# Patient Record
Sex: MALE | Race: WHITE
[De-identification: names, ages, dates, MRNs, and addresses within clinical notes are randomized per-mention and may not be internally consistent; named-entity substitution may affect disease eponyms.]

---

## 2012-11-17 VITALS — DIASTOLIC BLOOD PRESSURE: 83 MMHG | SYSTOLIC BLOOD PRESSURE: 135 MMHG

## 2018-10-03 ENCOUNTER — HOSPITAL ENCOUNTER (EMERGENCY)
Dept: HOSPITAL 33 - ED | Age: 38
Discharge: LEFT BEFORE DISCHARGE | End: 2018-10-03
Payer: COMMERCIAL

## 2018-10-03 ENCOUNTER — HOSPITAL ENCOUNTER (EMERGENCY)
Dept: HOSPITAL 33 - ED | Age: 38
Discharge: LEFT BEFORE DISCHARGE | End: 2018-10-03
Payer: MEDICAID

## 2018-10-03 VITALS — HEART RATE: 115 BPM | DIASTOLIC BLOOD PRESSURE: 56 MMHG | SYSTOLIC BLOOD PRESSURE: 104 MMHG

## 2018-10-03 VITALS — OXYGEN SATURATION: 94 %

## 2018-10-03 DIAGNOSIS — R31.9: ICD-10-CM

## 2018-10-03 DIAGNOSIS — R10.33: ICD-10-CM

## 2018-10-03 DIAGNOSIS — Z53.21: Primary | ICD-10-CM

## 2018-10-03 DIAGNOSIS — K92.0: Primary | ICD-10-CM

## 2018-10-03 LAB
ALBUMIN SERPL-MCNC: 4.5 G/DL (ref 3.5–5)
ALP SERPL-CCNC: 74 U/L (ref 38–126)
ALT SERPL-CCNC: 281 U/L (ref 0–50)
ANION GAP SERPL CALC-SCNC: 16.2 MEQ/L (ref 5–15)
AST SERPL QL: 132 U/L (ref 17–59)
BASOPHILS # BLD AUTO: 0.05 10*3/UL (ref 0–0.4)
BASOPHILS NFR BLD AUTO: 0.3 % (ref 0–0.4)
BILIRUB BLD-MCNC: 0.9 MG/DL (ref 0.2–1.3)
BUN SERPL-MCNC: 12 MG/DL (ref 9–20)
CALCIUM SPEC-MCNC: 9.6 MG/DL (ref 8.4–10.2)
CHLORIDE SERPL-SCNC: 105 MMOL/L (ref 98–107)
CO2 SERPL-SCNC: 23 MMOL/L (ref 22–30)
CREAT SERPL-MCNC: 0.95 MG/DL (ref 0.66–1.25)
EOSINOPHIL # BLD AUTO: 0.1 10*3/UL (ref 0–0.5)
GLUCOSE SERPL-MCNC: 85 MG/DL (ref 74–106)
GLUCOSE UR-MCNC: NEGATIVE MG/DL
GRANULOCYTES # BLD AUTO: 10.99 10*3/UL (ref 1.4–6.9)
HCT VFR BLD AUTO: 46.7 % (ref 42–50)
HGB BLD-MCNC: 16.4 GM/DL (ref 12.5–18)
LIPASE SERPL-CCNC: 40 U/L (ref 23–300)
LYMPHOCYTES # SPEC AUTO: 2.02 10*3/UL (ref 1–4.6)
MCH RBC QN AUTO: 31.1 PG (ref 26–32)
MCHC RBC AUTO-ENTMCNC: 35.1 G/DL (ref 32–36)
MONOCYTES # BLD AUTO: 1.32 10*3/UL (ref 0–1.3)
NEUTROPHILS NFR BLD AUTO: 75.9 % (ref 36–66)
PLATELET # BLD AUTO: 265 K/MM3 (ref 150–450)
POTASSIUM SERPLBLD-SCNC: 4.2 MMOL/L (ref 3.5–5.1)
PROT SERPL-MCNC: 7.1 G/DL (ref 6.3–8.2)
PROT UR STRIP-MCNC: 100 MG/DL
RBC # BLD AUTO: 5.28 M/MM3 (ref 4.1–5.6)
SODIUM SERPL-SCNC: 141 MMOL/L (ref 137–145)
WBC # BLD AUTO: 14.5 K/MM3 (ref 4–10.5)

## 2018-10-03 PROCEDURE — 87086 URINE CULTURE/COLONY COUNT: CPT

## 2018-10-03 PROCEDURE — 96360 HYDRATION IV INFUSION INIT: CPT

## 2018-10-03 PROCEDURE — 74176 CT ABD & PELVIS W/O CONTRAST: CPT

## 2018-10-03 PROCEDURE — 83690 ASSAY OF LIPASE: CPT

## 2018-10-03 PROCEDURE — 36415 COLL VENOUS BLD VENIPUNCTURE: CPT

## 2018-10-03 PROCEDURE — 96374 THER/PROPH/DIAG INJ IV PUSH: CPT

## 2018-10-03 PROCEDURE — 99284 EMERGENCY DEPT VISIT MOD MDM: CPT

## 2018-10-03 PROCEDURE — 82271 OCCULT BLOOD OTHER SOURCES: CPT

## 2018-10-03 PROCEDURE — 96375 TX/PRO/DX INJ NEW DRUG ADDON: CPT

## 2018-10-03 PROCEDURE — 81001 URINALYSIS AUTO W/SCOPE: CPT

## 2018-10-03 PROCEDURE — 83605 ASSAY OF LACTIC ACID: CPT

## 2018-10-03 PROCEDURE — 85025 COMPLETE CBC W/AUTO DIFF WBC: CPT

## 2018-10-03 PROCEDURE — 80053 COMPREHEN METABOLIC PANEL: CPT

## 2018-10-03 NOTE — XRAY
Indication: Abdominal pain and vomiting.



Multiple contiguous axial images obtained through the abdomen and pelvis

without contrast as ordered.



Comparison: CT pelvis January 16, 2013.



Lung bases are clear.  Heart is not enlarged.



Images through the abdomen slightly degraded by respiration artifact.

Noncontrasted stomach and bowel loops appear nonobstructed.  Normal appendix.

No free fluid/air.  Faint nonobstructing bilateral nephrocalcinosis.  5 mm

round right lobe hepatic hypodense lesion.



Remaining liver, gallbladder, pancreas, spleen, adrenal glands, kidneys,

ureters, and bladder appear unremarkable for noncontrast exam.  Again mild

bilateral iliac calcifications.  No AAA.



Osseous structures intact.  No ventral or inguinal hernias.



Impression:

1.  Respiration artifact.

2.  5 mm round hepatic hypodense lesion, possible cyst.

3.  Nonobstructing bilateral nephrocalcinosis.

4.  Remaining CT abdomen/pelvis without contrast exam is negative.



CTDI 18.41

## 2018-10-03 NOTE — ERPHSYRPT
- History of Present Illness


Time Seen by Provider: 10/03/18 14:57


Historian: patient, EMS


Exam Limitations: no limitations


Patient Subjective Stated Complaint: PT HERE FOR VOMITING,  AFTER EATING [employer], AND BIKING TO AN INTERVIEW


Triage Nursing Assessment: PT ALERT, RESP EASY, SKIN W/D/P. ABD SOFT, HE STATES 

THERE MIGHT HAV EBEEN BLOOD IN VOMIT


Physician History: 





The patient is a 38-year-old male brought in by ambulance from [facility] where he complained of having a sudden onset of lower abdominal pain 

followed by vomiting.  He states he was vomiting blood.  The vomiting of blood 

worried him so he called the ambulance.  He denies fever or chills.  He had 

just eaten at a [employer] prior to vomiting.  His past medical history is 

unremarkable.  His past surgical history is unremarkable.


Timing/Duration: today, hour(s) (2), sudden


Activities at Onset: none


Quality: sharpness, stabbing


Abdominal Pain Onset Location: periumbilical, suprapubic


Pain Radiation: no radiation


Severity of Pain-Max: severe


Severity of Pain-Current: severe


Modifying Factors: Improves With: nothing


Associated Symptoms: nausea, vomiting, No diarrhea


Previous symptoms: no prior history


Allergies/Adverse Reactions: 








No Known Drug Allergies Allergy (Verified 10/03/18 14:45)


 





Home Medications: 








No Home Meds [No Home Meds****]  05/31/14 [History]





Hx Tetanus, Diphtheria Vaccination/Date Given: Yes (2011)


Hx Influenza Vaccination/Date Given: No


Hx Pneumococcal Vaccination/Date Given: No


Immunizations Up to Date: Yes





- Review of Systems


Constitutional: No Fever, No Chills


Eyes: No Symptoms


Ears, Nose, & Throat: No Symptoms


Respiratory: No Cough, No Dyspnea


Cardiac: No Chest Pain, No Edema, No Syncope


Abdominal/Gastrointestinal: Abdominal Pain, Nausea, Vomiting, Hematemesis


Genitourinary Symptoms: No Dysuria


Musculoskeletal: No Back Pain, No Neck Pain


Skin: No Rash


Neurological: No Dizziness, No Focal Weakness, No Sensory Changes


Psychological: No Symptoms


Endocrine: No Symptoms


Hematologic/Lymphatic: No Symptoms


Immunological/Allergic: No Symptoms


All Other Systems: Reviewed and Negative





- Past Medical History


Pertinent Past Medical History: No


Neurological History: No Pertinent History


ENT History: No Pertinent History


Cardiac History: No Pertinent History


Respiratory History: No Pertinent History


Endocrine Medical History: No Pertinent History


Musculoskeletal History: No Pertinent History, Other


GI Medical History: No Pertinent History, Stomach Cancer


Psycho-Social History: No Pertinent History


Male Reproductive Disorders: No Pertinent History





- Past Surgical History


Past Surgical History: No


Neuro Surgical History: No Pertinent History


Respiratory: No Pertinent History


Gastrointestinal: No Pertinent History


Genitourinary: No Pertinent History


Musculoskeletal: No Pertinent History





- Social History


Smoking Status: Current every day smoker


How long have you smoked: 10


Exposure to second hand smoke: Yes


Alcohol Use: Socially


Drug Use: marijuana


Patient Lives Alone: No


Significant Family History: no pertinent family hx





- Nursing Vital Signs


Nursing Vital Signs: 


 Initial Vital Signs











Temperature  97.7 F   10/03/18 14:39


 


Pulse Rate  105 H  10/03/18 14:39


 


Respiratory Rate  16   10/03/18 14:39


 


Blood Pressure  145/93   10/03/18 14:39


 


O2 Sat by Pulse Oximetry  94 L  10/03/18 14:39








 Pain Scale











Pain Intensity                 2

















- Physical Exam


General Appearance: moderate distress


Eye Exam: PERRL/EOMI, eyes nml inspection


Ears, Nose, Throat Exam: normal ENT inspection, pharynx normal, moist mucous 

membranes


Neck Exam: normal inspection, non-tender, supple, full range of motion


Respiratory Exam: normal breath sounds, lungs clear, No respiratory distress


Cardiovascular Exam: regular rate/rhythm, normal heart sounds


Gastrointestinal/Abdomen Exam: tenderness (generalized. pronounced tenderness 

to suprapubic area.)


Rectal Exam: not done


Back Exam: normal inspection, normal range of motion, No CVA tenderness, No 

vertebral tenderness


Extremity Exam: normal inspection, normal range of motion, pelvis stable


Neurologic Exam: alert, oriented x 3, cooperative, normal mood/affect, nml 

cerebellar function, sensation nml, No motor deficits


Skin Exam: normal color, warm, dry


**SpO2 Interpretation**: normal


SpO2: 94


Oxygen Delivery: Room Air





- CT Exams


  ** Abdomen/Pelvis


CT Interpretation: Tele-radiologist Report (per Dr Araujo), Other (nonobstructing 

bilateral nephrocalcinosis.)


Ordered Tests: 


 Active Orders 24 hr











 Category Date Time Status


 


 Clean Catch Urine Specimen STAT Care  10/03/18 15:15 Active


 


 IV Insertion STAT Care  10/03/18 15:15 Active


 


 ABDOMEN AND PELVIS W/0 CONTRAS [CT] Stat Exams  10/03/18 15:15 Completed


 


 CBC W DIFF Stat Lab  10/03/18 15:30 Completed


 


 CMP Stat Lab  10/03/18 15:30 Completed


 


 CULTURE,URINE Stat Lab  10/03/18 16:30 Received


 


 LIPASE Stat Lab  10/03/18 15:30 Completed


 


 Lactic Acid Stat Lab  10/03/18 15:40 Completed


 


 OCCULT BLOOD, EMESIS Stat Lab  10/03/18 15:20 Completed


 


 UA W/RFX UR CULTURE Stat Lab  10/03/18 16:30 Completed








Medication Summary














Discontinued Medications














Generic Name Dose Route Start Last Admin





  Trade Name Puja  PRN Reason Stop Dose Admin


 


Sodium Chloride  1,000 mls @ 999 mls/hr  10/03/18 15:15  10/03/18 15:26





  Sodium Chloride 0.9% 1000 Ml  IV  10/03/18 16:15  999 mls/hr





  .Q1H1M STA   Administration





     





     





     





     


 


Sodium Chloride  Confirm  10/03/18 15:21  





  Sodium Chloride 0.9% 1000 Ml  Administered  10/03/18 15:22  





  Dose   





  1,000 mls @ ud   





  .ROUTE   





  .STK-MED ONE   





     





     





     





     


 


Morphine Sulfate  4 mg  10/03/18 15:15  10/03/18 15:23





  Morphine Sulfate 4 Mg Inj***  IV  10/03/18 15:16  4 mg





  STAT ONE   Administration





     





     





     





     


 


Morphine Sulfate  Confirm  10/03/18 15:21  





  Morphine Sulfate 4 Mg Inj***  Administered  10/03/18 15:22  





  Dose   





  4 mg   





  .ROUTE   





  .STK-MED ONE   





     





     





     





     


 


Ondansetron HCl  4 mg  10/03/18 14:52  10/03/18 14:53





  Zofran 4 Mg/2 Ml Vial**  IV  10/03/18 14:53  4 mg





  STAT ONE   Administration





     





     





     





     


 


Ondansetron HCl  Confirm  10/03/18 14:51  





  Zofran 4 Mg/2 Ml Vial**  Administered  10/03/18 14:52  





  Dose   





  4 mg   





  .ROUTE   





  .STK-MED ONE   





     





     





     





     


 


Promethazine HCl  25 mg  10/03/18 15:01  10/03/18 15:06





  Phenergan 25 Mg Inj***  IV  10/03/18 15:02  25 mg





  STAT ONE   Administration





     





     





     





     


 


Promethazine HCl  Confirm  10/03/18 15:05  





  Phenergan 25 Mg Inj***  Administered  10/03/18 15:06  





  Dose   





  25 mg   





  .ROUTE   





  .STK-MED ONE   





     





     





     





     











Lab/Rad Data: 


 Laboratory Result Diagrams





 10/03/18 15:30 





 10/03/18 15:30 





 Laboratory Results











  10/03/18 10/03/18 10/03/18 Range/Units





  16:30 15:40 15:30 


 


WBC     (4.0-10.5)  K/mm3


 


RBC     (4.1-5.6)  M/mm3


 


Hgb     (12.5-18.0)  gm/dl


 


Hct     (42-50)  %


 


MCV     ()  fl


 


MCH     (26-32)  pg


 


MCHC     (32-36)  g/dl


 


RDW     (11.5-14.0)  %


 


Plt Count     (150-450)  K/mm3


 


MPV     (6-9.5)  fl


 


Gran %     (36.0-66.0)  %


 


Eos # (Auto)     (0-0.5)  


 


Absolute Lymphs (auto)     (1.0-4.6)  


 


Absolute Monos (auto)     (0.0-1.3)  


 


Lymphocytes %     (24.0-44.0)  %


 


Monocytes %     (0.0-12.0)  %


 


Eosinophils %     (0.00-5.0)  %


 


Basophils %     (0.0-0.4)  %


 


Absolute Granulocytes     (1.4-6.9)  


 


Basophils #     (0-0.4)  


 


Sodium    141  (137-145)  mmol/L


 


Potassium    4.2  (3.5-5.1)  mmol/L


 


Chloride    105  ()  mmol/L


 


Carbon Dioxide    23  (22-30)  mmol/L


 


Anion Gap    16.2 H  (5-15)  MEQ/L


 


BUN    12  (9-20)  mg/dL


 


Creatinine    0.95  (0.66-1.25)  mg/dL


 


Estimated GFR    > 60.0  ML/MIN


 


Glucose    85  ()  mg/dL


 


Lactic Acid   1.0   (0.4-2.0)  


 


Calcium    9.6  (8.4-10.2)  mg/dL


 


Total Bilirubin    0.90  (0.2-1.3)  mg/dL


 


AST    132 H  (17-59)  U/L


 


ALT    281 H  (0-50)  U/L


 


Alkaline Phosphatase    74  ()  U/L


 


Serum Total Protein    7.1  (6.3-8.2)  g/dL


 


Albumin    4.5  (3.5-5.0)  g/dL


 


Lipase    40  ()  U/L


 


Urine Color  IVA    (YELLOW)  


 


Urine Appearance  SLIGHTLY CLOUDY    (CLEAR)  


 


Urine pH  5.0    (5-6)  


 


Ur Specific Gravity  1.023    (1.005-1.025)  


 


Urine Protein  100    (Negative)  


 


Urine Ketones  SMALL    (NEGATIVE)  


 


Urine Blood  LARGE    (0-5)  Blanco/ul


 


Urine Nitrite  NEGATIVE    (NEGATIVE)  


 


Urine Bilirubin  NEGATIVE    (NEGATIVE)  


 


Urine Urobilinogen  2    (0-1)  mg/dL


 


Ur Leukocyte Esterase  NEGATIVE    (NEGATIVE)  


 


Urine WBC (Auto)  6-10    (0-5)  /HPF


 


Urine RBC (Auto)  >101    (0-2)  /HPF


 


U Hyaline Cast (Auto)  10-25    (0-2)  /LPF


 


U Epithel Cells (Auto)  RARE    (FEW)  /HPF


 


Urine Bacteria (Auto)  FEW    (NEGATIVE)  /HPF


 


Other Casts (Auto)  5-10    (NEGATIVE)  /LPF


 


Urine Mucus (Auto)  SLIGHT    (NEGATIVE)  /HPF


 


Urine Culture Reflexed  YES    (NO)  


 


Urine Glucose  NEGATIVE    (NEGATIVE)  mg/dL


 


Emesis for Blood     (Negative)  














  10/03/18 10/03/18 Range/Units





  15:30 15:20 


 


WBC  14.5 H   (4.0-10.5)  K/mm3


 


RBC  5.28   (4.1-5.6)  M/mm3


 


Hgb  16.4   (12.5-18.0)  gm/dl


 


Hct  46.7   (42-50)  %


 


MCV  88.4   ()  fl


 


MCH  31.1   (26-32)  pg


 


MCHC  35.1   (32-36)  g/dl


 


RDW  13.6   (11.5-14.0)  %


 


Plt Count  265   (150-450)  K/mm3


 


MPV  10.3 H   (6-9.5)  fl


 


Gran %  75.9 H   (36.0-66.0)  %


 


Eos # (Auto)  0.10   (0-0.5)  


 


Absolute Lymphs (auto)  2.02   (1.0-4.6)  


 


Absolute Monos (auto)  1.32 H   (0.0-1.3)  


 


Lymphocytes %  14.0 L   (24.0-44.0)  %


 


Monocytes %  9.1   (0.0-12.0)  %


 


Eosinophils %  0.7   (0.00-5.0)  %


 


Basophils %  0.3   (0.0-0.4)  %


 


Absolute Granulocytes  10.99 H   (1.4-6.9)  


 


Basophils #  0.05   (0-0.4)  


 


Sodium    (137-145)  mmol/L


 


Potassium    (3.5-5.1)  mmol/L


 


Chloride    ()  mmol/L


 


Carbon Dioxide    (22-30)  mmol/L


 


Anion Gap    (5-15)  MEQ/L


 


BUN    (9-20)  mg/dL


 


Creatinine    (0.66-1.25)  mg/dL


 


Estimated GFR    ML/MIN


 


Glucose    ()  mg/dL


 


Lactic Acid    (0.4-2.0)  


 


Calcium    (8.4-10.2)  mg/dL


 


Total Bilirubin    (0.2-1.3)  mg/dL


 


AST    (17-59)  U/L


 


ALT    (0-50)  U/L


 


Alkaline Phosphatase    ()  U/L


 


Serum Total Protein    (6.3-8.2)  g/dL


 


Albumin    (3.5-5.0)  g/dL


 


Lipase    ()  U/L


 


Urine Color    (YELLOW)  


 


Urine Appearance    (CLEAR)  


 


Urine pH    (5-6)  


 


Ur Specific Gravity    (1.005-1.025)  


 


Urine Protein    (Negative)  


 


Urine Ketones    (NEGATIVE)  


 


Urine Blood    (0-5)  Blanco/ul


 


Urine Nitrite    (NEGATIVE)  


 


Urine Bilirubin    (NEGATIVE)  


 


Urine Urobilinogen    (0-1)  mg/dL


 


Ur Leukocyte Esterase    (NEGATIVE)  


 


Urine WBC (Auto)    (0-5)  /HPF


 


Urine RBC (Auto)    (0-2)  /HPF


 


U Hyaline Cast (Auto)    (0-2)  /LPF


 


U Epithel Cells (Auto)    (FEW)  /HPF


 


Urine Bacteria (Auto)    (NEGATIVE)  /HPF


 


Other Casts (Auto)    (NEGATIVE)  /LPF


 


Urine Mucus (Auto)    (NEGATIVE)  /HPF


 


Urine Culture Reflexed    (NO)  


 


Urine Glucose    (NEGATIVE)  mg/dL


 


Emesis for Blood   POSITIVE  (Negative)  














- Progress


Progress: improved


Counseled pt/family regarding: lab results, diagnosis, rad results





- Departure


Time of Disposition: 17:19


Departure Disposition: AMA


Clinical Impression: 


 Vomiting blood, Abdominal pain, Hematuria





Condition: Stable


Critical Care Time: No


Referrals: 


BIBIANA PADILLA [Primary Care Provider] - 


Additional Instructions: 


The patient left before the final diagnosis was given him.  He had received a 

phone call from his wife stating that his son was in an automobile accident and 

was being taken to [facility] for critical care.  He left AMA.

## 2019-03-29 ENCOUNTER — HOSPITAL ENCOUNTER (EMERGENCY)
Dept: HOSPITAL 33 - ED | Age: 39
Discharge: HOME | End: 2019-03-29
Payer: COMMERCIAL

## 2019-03-29 VITALS — OXYGEN SATURATION: 97 %

## 2019-03-29 VITALS — HEART RATE: 77 BPM | SYSTOLIC BLOOD PRESSURE: 123 MMHG | DIASTOLIC BLOOD PRESSURE: 80 MMHG

## 2019-03-29 DIAGNOSIS — N20.0: Primary | ICD-10-CM

## 2019-03-29 LAB
ALBUMIN SERPL-MCNC: 3.9 G/DL (ref 3.5–5)
ALP SERPL-CCNC: 57 U/L (ref 38–126)
ALT SERPL-CCNC: 180 U/L (ref 0–50)
AMPHETAMINES UR QL: NEGATIVE
AMYLASE SERPL-CCNC: 75 U/L (ref 30–110)
ANION GAP SERPL CALC-SCNC: 12.2 MEQ/L (ref 5–15)
AST SERPL QL: 95 U/L (ref 17–59)
BARBITURATES UR QL: NEGATIVE
BASOPHILS # BLD AUTO: 0.05 10*3/UL (ref 0–0.4)
BASOPHILS NFR BLD AUTO: 0.6 % (ref 0–0.4)
BENZODIAZ UR QL SCN: NEGATIVE
BILIRUB BLD-MCNC: 0.5 MG/DL (ref 0.2–1.3)
BUN SERPL-MCNC: 14 MG/DL (ref 9–20)
CALCIUM SPEC-MCNC: 9.4 MG/DL (ref 8.4–10.2)
CHLORIDE SERPL-SCNC: 106 MMOL/L (ref 98–107)
CO2 SERPL-SCNC: 25 MMOL/L (ref 22–30)
COCAINE UR QL SCN: NEGATIVE
CREAT SERPL-MCNC: 0.77 MG/DL (ref 0.66–1.25)
EOSINOPHIL # BLD AUTO: 0.26 10*3/UL (ref 0–0.5)
GLUCOSE SERPL-MCNC: 96 MG/DL (ref 74–106)
GLUCOSE UR-MCNC: NEGATIVE MG/DL
GRANULOCYTES # BLD AUTO: 4.77 10*3/UL (ref 1.4–6.9)
HCT VFR BLD AUTO: 47.4 % (ref 42–50)
HGB BLD-MCNC: 16.2 GM/DL (ref 12.5–18)
LIPASE SERPL-CCNC: 67 U/L (ref 23–300)
LYMPHOCYTES # SPEC AUTO: 2.64 10*3/UL (ref 1–4.6)
MCH RBC QN AUTO: 31.1 PG (ref 26–32)
MCHC RBC AUTO-ENTMCNC: 34.2 G/DL (ref 32–36)
METHADONE UR QL: NEGATIVE
MONOCYTES # BLD AUTO: 0.89 10*3/UL (ref 0–1.3)
NEUTROPHILS NFR BLD AUTO: 55.4 % (ref 36–66)
OPIATES UR QL: NEGATIVE
PCP UR QL CFM>20 NG/ML: NEGATIVE
PLATELET # BLD AUTO: 230 K/MM3 (ref 150–450)
POTASSIUM SERPLBLD-SCNC: 4 MMOL/L (ref 3.5–5.1)
PROT SERPL-MCNC: 6.8 G/DL (ref 6.3–8.2)
PROT UR STRIP-MCNC: NEGATIVE MG/DL
RBC # BLD AUTO: 5.21 M/MM3 (ref 4.1–5.6)
RBC #/AREA URNS HPF: (no result) /HPF (ref 0–2)
SODIUM SERPL-SCNC: 138 MMOL/L (ref 137–145)
THC UR QL SCN: POSITIVE
WBC # BLD AUTO: 8.6 K/MM3 (ref 4–10.5)
WBC #/AREA URNS HPF: (no result) /HPF (ref 0–5)

## 2019-03-29 PROCEDURE — 96375 TX/PRO/DX INJ NEW DRUG ADDON: CPT

## 2019-03-29 PROCEDURE — 85025 COMPLETE CBC W/AUTO DIFF WBC: CPT

## 2019-03-29 PROCEDURE — 36415 COLL VENOUS BLD VENIPUNCTURE: CPT

## 2019-03-29 PROCEDURE — 83690 ASSAY OF LIPASE: CPT

## 2019-03-29 PROCEDURE — 99284 EMERGENCY DEPT VISIT MOD MDM: CPT

## 2019-03-29 PROCEDURE — 82150 ASSAY OF AMYLASE: CPT

## 2019-03-29 PROCEDURE — 81001 URINALYSIS AUTO W/SCOPE: CPT

## 2019-03-29 PROCEDURE — 74178 CT ABD&PLV WO CNTR FLWD CNTR: CPT

## 2019-03-29 PROCEDURE — 96374 THER/PROPH/DIAG INJ IV PUSH: CPT

## 2019-03-29 PROCEDURE — 36000 PLACE NEEDLE IN VEIN: CPT

## 2019-03-29 PROCEDURE — 96360 HYDRATION IV INFUSION INIT: CPT

## 2019-03-29 PROCEDURE — 80307 DRUG TEST PRSMV CHEM ANLYZR: CPT

## 2019-03-29 PROCEDURE — 80053 COMPREHEN METABOLIC PANEL: CPT

## 2019-03-29 NOTE — ERPHSYRPT
- History of Present Illness


Time Seen by Provider: 03/29/19 16:30


Historian: patient


Exam Limitations: clinical condition


Patient Subjective Stated Complaint: RIGHT LOWER ABD PAIN FOR ONE WEEK.  

VOMITED YESTERDAY.  STATES PAIN IS SHARP.


Triage Nursing Assessment: TO ROOM PER EMS COT.  SKIN W/D, COLOR NORMAL, RESP 

EASY.  ABD SOFT, GUARDING RLQ.


Physician History: 





PATIENT WITH A HISTORY OF KIDNEY STONES COMPLAINS OF RIGHT LOWER ABDOMINAL 

PAINS FOR 2 WEEKS, ASSOCIATED WITH NAUSEA, DENIES EMESIS, FEVER OR URINARY 

SYMPTOMS FREQUENCY, URGENCY OR DYSURIA.


Timing/Duration: week(s)


Activities at Onset: none


Quality: stabbing


Abdominal Pain Onset Location: [facility]


Pain Radiation: no radiation


Severity of Pain-Max: severe


Severity of Pain-Current: severe


Modifying Factors: Improves With: nothing


Associated Symptoms: denies symptoms


Previous symptoms: same symptoms as today


Allergies/Adverse Reactions: 








No Known Drug Allergies Allergy (Verified 03/29/19 16:28)


 





Home Medications: 








No Home Meds [No Home Meds****]  05/31/14 [History]





Hx Tetanus, Diphtheria Vaccination/Date Given: Yes (2015)


Hx Influenza Vaccination/Date Given: No


Hx Pneumococcal Vaccination/Date Given: No





- Review of Systems


Constitutional: No Fever, No Chills


Eyes: No Symptoms


Ears, Nose, & Throat: No Symptoms


Respiratory: No Symptoms, No Cough, No Dyspnea


Cardiac: No Symptoms, No Chest Pain, No Edema, No Syncope


Abdominal/Gastrointestinal: Abdominal Pain, Nausea, No Vomiting, No Diarrhea


Genitourinary Symptoms: No Symptoms, No Dysuria


Musculoskeletal: No Symptoms, No Back Pain, No Neck Pain


Skin: No Rash


Neurological: No Dizziness, No Focal Weakness, No Sensory Changes


Psychological: No Symptoms


Endocrine: No Symptoms


All Other Systems: Reviewed and Negative





- Past Medical History


Pertinent Past Medical History: No


Neurological History: No Pertinent History


ENT History: No Pertinent History


Cardiac History: No Pertinent History


Respiratory History: No Pertinent History


Endocrine Medical History: No Pertinent History


Musculoskeletal History: No Pertinent History


GI Medical History: No Pertinent History


Psycho-Social History: No Pertinent History


Male Reproductive Disorders: No Pertinent History





- Past Surgical History


Past Surgical History: Yes


Neuro Surgical History: No Pertinent History


Respiratory: No Pertinent History


Gastrointestinal: No Pertinent History


Genitourinary: No Pertinent History


Musculoskeletal: No Pertinent History


Other Surgical History: RIGHT ARM SURGERY





- Social History


Smoking Status: Current every day smoker


How long have you smoked: 10


Exposure to second hand smoke: Yes


Alcohol Use: Socially


Drug Use: marijuana


Patient Lives Alone: No


Significant Family History: no pertinent family hx





- Nursing Vital Signs


Nursing Vital Signs: 


 Initial Vital Signs











Temperature  97.9 F   03/29/19 16:21


 


Pulse Rate  76   03/29/19 16:21


 


Respiratory Rate  16   03/29/19 16:21


 


Blood Pressure  129/72   03/29/19 16:21


 


O2 Sat by Pulse Oximetry  97   03/29/19 16:21








 Pain Scale











Pain Intensity                 5

















- Physical Exam


General Appearance: moderate distress, alert


Eye Exam: PERRL/EOMI, eyes nml inspection


Ears, Nose, Throat Exam: normal ENT inspection, pharynx normal, moist mucous 

membranes


Neck Exam: normal inspection, non-tender, supple, full range of motion


Respiratory Exam: normal breath sounds, lungs clear, No respiratory distress


Cardiovascular Exam: regular rate/rhythm, normal heart sounds


Gastrointestinal/Abdomen Exam: soft, normal bowel sounds, tenderness (RIGHT 

LOWER QUAD TENDERNESS), other (WITH GUARDING), No mass


Back Exam: normal inspection, normal range of motion, No CVA tenderness, No 

vertebral tenderness


Extremity Exam: normal inspection, normal range of motion, pelvis stable


Neurologic Exam: alert, oriented x 3, cooperative, normal mood/affect, nml 

cerebellar function, sensation nml, No motor deficits


Skin Exam: normal color, warm, dry


Lymphatic Exam: inguinal node tender (L)


SpO2: 97





- CT Exams


  ** Abdomen/Pelvis


CT Interpretation: Tele-radiologist Report (NORMAL APPENDIX, THERE IS PUNCTATE 

NONOBSTRUCTING RIGHT NEPHROLITHIASIS)


Ordered Tests: 


 Active Orders 24 hr











 Category Date Time Status


 


 Clean Catch Urine Specimen STAT Care  03/29/19 16:31 Active


 


 IV Insertion STAT Care  03/29/19 16:31 Active


 


 ABDOMEN AND PELVIS W&WO CONTRA [CT] Stat Exams  03/29/19 16:39 Taken


 


 AMYLASE Stat Lab  03/29/19 16:35 Completed


 


 CBC W DIFF Stat Lab  03/29/19 16:35 Completed


 


 CMP Stat Lab  03/29/19 16:35 Completed


 


 LIPASE Stat Lab  03/29/19 16:35 Completed


 


 UA W/RFX UR CULTURE Stat Lab  03/29/19 16:45 Completed


 


 Urine Triage Profile Stat Lab  03/29/19 16:45 Completed








Medication Summary














Discontinued Medications














Generic Name Dose Route Start Last Admin





  Trade Name Freq  PRN Reason Stop Dose Admin


 


Hydromorphone HCl  1 mg  03/29/19 16:52  03/29/19 16:57





  Hydromorphone 1 Mg/Ml Ampule***  IV  03/29/19 16:53  1 mg





  STAT ONE   Administration





     





     





     





     


 


Hydromorphone HCl  Confirm  03/29/19 16:54  





  Hydromorphone 1 Mg/Ml Ampule***  Administered  03/29/19 16:55  





  Dose   





  1 mg   





  .ROUTE   





  .STK-MED ONE   





     





     





     





     


 


Sodium Chloride  1,000 mls @ 999 mls/hr  03/29/19 16:31  03/29/19 17:52





  Sodium Chloride 0.9% 1000 Ml  IV  03/29/19 17:31  Infused





  .Q1H1M STA   Infusion





     





     





     





     


 


Sodium Chloride  Confirm  03/29/19 16:32  





  Sodium Chloride 0.9% 1000 Ml  Administered  03/29/19 16:33  





  Dose   





  1,000 mls @ ud   





  .ROUTE   





  .STK-MED ONE   





     





     





     





     


 


Ketorolac Tromethamine  30 mg  03/29/19 16:41  03/29/19 16:55





  Toradol 30 Mg Injection***  IV  03/29/19 16:42  30 mg





  STAT ONE   Administration





     





     





     





     


 


Ketorolac Tromethamine  Confirm  03/29/19 16:50  





  Toradol 30 Mg Injection***  Administered  03/29/19 16:51  





  Dose   





  30 mg   





  .ROUTE   





  .STK-MED ONE   





     





     





     





     


 


Ondansetron HCl  4 mg  03/29/19 16:41  03/29/19 16:55





  Zofran 4 Mg/2 Ml Vial**  IV  03/29/19 16:42  4 mg





  STAT ONE   Administration





     





     





     





     


 


Ondansetron HCl  Confirm  03/29/19 16:50  





  Zofran 4 Mg/2 Ml Vial**  Administered  03/29/19 16:51  





  Dose   





  4 mg   





  .ROUTE   





  .STK-MED ONE   





     





     





     





     











Lab/Rad Data: 


 Laboratory Result Diagrams





 03/29/19 16:35 





 03/29/19 16:35 





 Laboratory Results











  03/29/19 03/29/19 03/29/19 Range/Units





  16:45 16:45 16:35 


 


WBC     (4.0-10.5)  K/mm3


 


RBC     (4.1-5.6)  M/mm3


 


Hgb     (12.5-18.0)  gm/dl


 


Hct     (42-50)  %


 


MCV     ()  fl


 


MCH     (26-32)  pg


 


MCHC     (32-36)  g/dl


 


RDW     (11.5-14.0)  %


 


Plt Count     (150-450)  K/mm3


 


MPV     (6-9.5)  fl


 


Gran %     (36.0-66.0)  %


 


Eos # (Auto)     (0-0.5)  


 


Absolute Lymphs (auto)     (1.0-4.6)  


 


Absolute Monos (auto)     (0.0-1.3)  


 


Lymphocytes %     (24.0-44.0)  %


 


Monocytes %     (0.0-12.0)  %


 


Eosinophils %     (0.00-5.0)  %


 


Basophils %     (0.0-0.4)  %


 


Absolute Granulocytes     (1.4-6.9)  


 


Basophils #     (0-0.4)  


 


Sodium    138  (137-145)  mmol/L


 


Potassium    4.0  (3.5-5.1)  mmol/L


 


Chloride    106  ()  mmol/L


 


Carbon Dioxide    25  (22-30)  mmol/L


 


Anion Gap    12.2  (5-15)  MEQ/L


 


BUN    14  (9-20)  mg/dL


 


Creatinine    0.77  (0.66-1.25)  mg/dL


 


Estimated GFR    > 60.0  ML/MIN


 


Glucose    96  ()  mg/dL


 


Calcium    9.4  (8.4-10.2)  mg/dL


 


Total Bilirubin    0.50  (0.2-1.3)  mg/dL


 


AST    95 H  (17-59)  U/L


 


ALT    180 H  (0-50)  U/L


 


Alkaline Phosphatase    57  ()  U/L


 


Serum Total Protein    6.8  (6.3-8.2)  g/dL


 


Albumin    3.9  (3.5-5.0)  g/dL


 


Amylase    75  ()  U/L


 


Lipase    67  ()  U/L


 


Urine Color   YELLOW   (YELLOW)  


 


Urine Appearance   CLEAR   (CLEAR)  


 


Urine pH   6.0   (5-6)  


 


Ur Specific Gravity   1.017   (1.005-1.025)  


 


Urine Protein   NEGATIVE   (Negative)  


 


Urine Ketones   NEGATIVE   (NEGATIVE)  


 


Urine Blood   NEGATIVE   (0-5)  Blanco/ul


 


Urine Nitrite   NEGATIVE   (NEGATIVE)  


 


Urine Bilirubin   NEGATIVE   (NEGATIVE)  


 


Urine Urobilinogen   2   (0-1)  mg/dL


 


Ur Leukocyte Esterase   NEGATIVE   (NEGATIVE)  


 


Urine WBC (Auto)   NONE   (0-5)  /HPF


 


Urine RBC (Auto)   NONE   (0-2)  /HPF


 


U Epithel Cells (Auto)   NONE   (FEW)  /HPF


 


Urine Bacteria (Auto)   NONE   (NEGATIVE)  /HPF


 


Urine Culture Reflexed   NO   (NO)  


 


Urine Glucose   NEGATIVE   (NEGATIVE)  mg/dL


 


Urine Opiates Level  NEGATIVE    (NEGATIVE)  


 


Ur Methadone  NEGATIVE    (NEGATIVE)  


 


Urine Barbiturates  NEGATIVE    (NEGATIVE)  


 


Ur Phencyclidine (PCP)  NEGATIVE    (NEGATIVE)  


 


Urine Amphetamine  NEGATIVE    (NEGATIVE)  


 


U Benzodiazepine Level  NEGATIVE    (NEGATIVE)  


 


Urine Cocaine  NEGATIVE    (NEGATIVE)  


 


Urine Marijuana (THC)  POSITIVE    (NEGATIVE)  














  03/29/19 Range/Units





  16:35 


 


WBC  8.6  (4.0-10.5)  K/mm3


 


RBC  5.21  (4.1-5.6)  M/mm3


 


Hgb  16.2  (12.5-18.0)  gm/dl


 


Hct  47.4  (42-50)  %


 


MCV  91.0  ()  fl


 


MCH  31.1  (26-32)  pg


 


MCHC  34.2  (32-36)  g/dl


 


RDW  13.2  (11.5-14.0)  %


 


Plt Count  230  (150-450)  K/mm3


 


MPV  10.7 H  (6-9.5)  fl


 


Gran %  55.4  (36.0-66.0)  %


 


Eos # (Auto)  0.26  (0-0.5)  


 


Absolute Lymphs (auto)  2.64  (1.0-4.6)  


 


Absolute Monos (auto)  0.89  (0.0-1.3)  


 


Lymphocytes %  30.7  (24.0-44.0)  %


 


Monocytes %  10.3  (0.0-12.0)  %


 


Eosinophils %  3.0  (0.00-5.0)  %


 


Basophils %  0.6  (0.0-0.4)  %


 


Absolute Granulocytes  4.77  (1.4-6.9)  


 


Basophils #  0.05  (0-0.4)  


 


Sodium   (137-145)  mmol/L


 


Potassium   (3.5-5.1)  mmol/L


 


Chloride   ()  mmol/L


 


Carbon Dioxide   (22-30)  mmol/L


 


Anion Gap   (5-15)  MEQ/L


 


BUN   (9-20)  mg/dL


 


Creatinine   (0.66-1.25)  mg/dL


 


Estimated GFR   ML/MIN


 


Glucose   ()  mg/dL


 


Calcium   (8.4-10.2)  mg/dL


 


Total Bilirubin   (0.2-1.3)  mg/dL


 


AST   (17-59)  U/L


 


ALT   (0-50)  U/L


 


Alkaline Phosphatase   ()  U/L


 


Serum Total Protein   (6.3-8.2)  g/dL


 


Albumin   (3.5-5.0)  g/dL


 


Amylase   ()  U/L


 


Lipase   ()  U/L


 


Urine Color   (YELLOW)  


 


Urine Appearance   (CLEAR)  


 


Urine pH   (5-6)  


 


Ur Specific Gravity   (1.005-1.025)  


 


Urine Protein   (Negative)  


 


Urine Ketones   (NEGATIVE)  


 


Urine Blood   (0-5)  Blanco/ul


 


Urine Nitrite   (NEGATIVE)  


 


Urine Bilirubin   (NEGATIVE)  


 


Urine Urobilinogen   (0-1)  mg/dL


 


Ur Leukocyte Esterase   (NEGATIVE)  


 


Urine WBC (Auto)   (0-5)  /HPF


 


Urine RBC (Auto)   (0-2)  /HPF


 


U Epithel Cells (Auto)   (FEW)  /HPF


 


Urine Bacteria (Auto)   (NEGATIVE)  /HPF


 


Urine Culture Reflexed   (NO)  


 


Urine Glucose   (NEGATIVE)  mg/dL


 


Urine Opiates Level   (NEGATIVE)  


 


Ur Methadone   (NEGATIVE)  


 


Urine Barbiturates   (NEGATIVE)  


 


Ur Phencyclidine (PCP)   (NEGATIVE)  


 


Urine Amphetamine   (NEGATIVE)  


 


U Benzodiazepine Level   (NEGATIVE)  


 


Urine Cocaine   (NEGATIVE)  


 


Urine Marijuana (THC)   (NEGATIVE)  














- Progress


Progress: improved, pain not gone completely


Progress Note: 





03/29/19 17:20


IV NORMAL SALINE 1000ML/HR, ZOFRAN 4MG, TORADOL 30MG, DILAUDID 1MG IV


Counseled pt/family regarding: lab results, diagnosis, need for follow-up, rad 

results





- Departure


Departure Disposition: Home


Clinical Impression: 


 RIGHT NONOBSTRUCTING NEPHROLITHIASIS





Condition: Stable


Critical Care Time: No


Referrals: 


DOCTOR,NO FAMILY [NON-STAFF PHY W/O PRIVILEGES] - 


Additional Instructions: 


CALL UROLOGIST DR LIU IN 3 DAYS FOR APPOINTMENT [phone]. STRAIN YOUR 

URINE FOR 1 WEEK. TORADOL 10MG EVERY 6 HOURS AS NEEDED FOR PAIN. FLOMAX 0.4MG 

DAILY FOR 10 DAYS. ALSO CONSULT YOUR PRIMARY CARE PROVIDER FOR FOLLOWUP.


Prescriptions: 


Ketorolac Tromethamine [Toradol] 10 mg PO Q6HPRN PRN #20 tablet


 PRN Reason: Pain


Tamsulosin HCl 0.4 mg*** [Flomax 0.4 MG***] 0.4 mg PO DAILY #10 cap

## 2019-04-01 NOTE — XRAY
Indication: Right lower abdomen pain 1 week.  Emesis.



Multiple contiguous axial images obtained through the abdomen and pelvis prior

to and following 80 cc Isovue 370 contrast as ordered.



Comparison: October 3, 2018.



Lung bases remain clear.  Heart is not enlarged.



Noncontrasted images demonstrates stable nonobstructing faint bilateral

nephrocalcinosis.  No new pathologic visceral calcification/calculi.

Noncontrasted stomach and bowel loops appear nonobstructed.  Normal appendix.

There is now moderate diffuse scattered colonic fecal debris throughout.  No

free fluid/air.



Postcontrast images demonstrates normal visceral enhancement and renal

excretion.  Stable subcentimeter hepatic cyst.  Spleen remains enlarged

measuring 13.8 cm in greatest axial dimension.  Remaining liver, gallbladder,

pancreas, spleen, adrenal glands, kidneys, ureters, bladder, and aorta appear

unremarkable.  No pathologic retroperitoneal lymphadenopathy.



Osseous structures intact.  No ventral or inguinal hernias.



Impression:

1.  Stable nonobstructing bilateral nephrocalcinosis.

2.  Stable tiny hepatic cyst and splenomegaly.

3.  New fecal stasis without obstruction.

4.  Remaining CT abdomen/pelvis with and without contrast exam is negative.



Comment: Preliminary interpretation was made by C.  No critical discrepancy.



CTDI 20.65

## 2019-08-29 ENCOUNTER — HOSPITAL ENCOUNTER (EMERGENCY)
Dept: HOSPITAL 33 - ED | Age: 39
LOS: 1 days | Discharge: ANOTHER HOSPITAL | End: 2019-08-30
Payer: COMMERCIAL

## 2019-08-29 DIAGNOSIS — I63.9: Primary | ICD-10-CM

## 2019-08-29 LAB
AMPHETAMINES UR QL: POSITIVE
ANION GAP SERPL CALC-SCNC: 13.6 MEQ/L (ref 5–15)
BARBITURATES UR QL: NEGATIVE
BASOPHILS # BLD AUTO: 0.05 10*3/UL (ref 0–0.4)
BASOPHILS NFR BLD AUTO: 0.6 % (ref 0–0.4)
BENZODIAZ UR QL SCN: NEGATIVE
BUN SERPL-MCNC: 15 MG/DL (ref 9–20)
CALCIUM SPEC-MCNC: 9.3 MG/DL (ref 8.4–10.2)
CHLORIDE SERPL-SCNC: 105 MMOL/L (ref 98–107)
CO2 SERPL-SCNC: 25 MMOL/L (ref 22–30)
COCAINE UR QL SCN: NEGATIVE
CREAT SERPL-MCNC: 0.84 MG/DL (ref 0.66–1.25)
EOSINOPHIL # BLD AUTO: 0.28 10*3/UL (ref 0–0.5)
ETHANOL SERPL-MCNC: < 10 MG/DL (ref 0–10)
GLUCOSE SERPL-MCNC: 122 MG/DL (ref 74–106)
GLUCOSE UR-MCNC: NEGATIVE MG/DL
GRANULOCYTES # BLD AUTO: 4.64 10*3/UL (ref 1.4–6.9)
HCT VFR BLD AUTO: 46.5 % (ref 42–50)
HGB BLD-MCNC: 16 GM/DL (ref 12.5–18)
LYMPHOCYTES # SPEC AUTO: 2.23 10*3/UL (ref 1–4.6)
MCH RBC QN AUTO: 30.9 PG (ref 26–32)
MCHC RBC AUTO-ENTMCNC: 34.4 G/DL (ref 32–36)
METHADONE UR QL: NEGATIVE
MONOCYTES # BLD AUTO: 0.68 10*3/UL (ref 0–1.3)
NEUTROPHILS NFR BLD AUTO: 58.9 % (ref 36–66)
OPIATES UR QL: NEGATIVE
PCP UR QL CFM>20 NG/ML: NEGATIVE
PLATELET # BLD AUTO: 221 K/MM3 (ref 150–450)
POTASSIUM SERPLBLD-SCNC: 3.6 MMOL/L (ref 3.5–5.1)
PROT UR STRIP-MCNC: 100 MG/DL
RBC # BLD AUTO: 5.17 M/MM3 (ref 4.1–5.6)
RBC #/AREA URNS HPF: >101 /HPF (ref 0–2)
SODIUM SERPL-SCNC: 139 MMOL/L (ref 137–145)
THC UR QL SCN: POSITIVE
WBC # BLD AUTO: 7.9 K/MM3 (ref 4–10.5)

## 2019-08-29 PROCEDURE — 84484 ASSAY OF TROPONIN QUANT: CPT

## 2019-08-29 PROCEDURE — 36415 COLL VENOUS BLD VENIPUNCTURE: CPT

## 2019-08-29 PROCEDURE — 93005 ELECTROCARDIOGRAM TRACING: CPT

## 2019-08-29 PROCEDURE — 96372 THER/PROPH/DIAG INJ SC/IM: CPT

## 2019-08-29 PROCEDURE — 85025 COMPLETE CBC W/AUTO DIFF WBC: CPT

## 2019-08-29 PROCEDURE — 80048 BASIC METABOLIC PNL TOTAL CA: CPT

## 2019-08-29 PROCEDURE — 70450 CT HEAD/BRAIN W/O DYE: CPT

## 2019-08-29 PROCEDURE — G0480 DRUG TEST DEF 1-7 CLASSES: HCPCS

## 2019-08-29 PROCEDURE — 85730 THROMBOPLASTIN TIME PARTIAL: CPT

## 2019-08-29 PROCEDURE — 87086 URINE CULTURE/COLONY COUNT: CPT

## 2019-08-29 PROCEDURE — 81001 URINALYSIS AUTO W/SCOPE: CPT

## 2019-08-29 PROCEDURE — 36000 PLACE NEEDLE IN VEIN: CPT

## 2019-08-29 PROCEDURE — 99285 EMERGENCY DEPT VISIT HI MDM: CPT

## 2019-08-29 PROCEDURE — 99291 CRITICAL CARE FIRST HOUR: CPT

## 2019-08-29 PROCEDURE — 80307 DRUG TEST PRSMV CHEM ANLYZR: CPT

## 2019-08-29 NOTE — ERPHSYRPT
- History of Present Illness


Source: patient, family


Exam Limitations: no limitations, clinical condition


Patient Subjective Stated Complaint: pt's girlfriend states, "he's had vomiting 

today x4 hours, his speech became slurred the last hour and pt c/o numbness 

around his rt eye".


Triage Nursing Assessment: pt alert and oriented x2, speech is garbled.  

girlfriend at bedside.  Lungs clear, heart tones reg, abd soft with active bs 

x4 quad, non-tender.  Pt denies headache but has a numbness over his right eye.

  Pupils are pinpoint.  Pt states vision is blurry.


Physician History: 





Pt is a 40 y/o male with a h/o previous CVA, that presented to the ED with 

symptoms of CVA.  Pt states, he was vomiting today, multiple times, and then he 

felt something pop in his R eye, and he had numbness of the R face, could not 

open his R eye, and had slurred speech.  Secondary to it, pt came to the ED.


Timing/Duration: today


Severity: moderate


Character of Deficits: Right Facial, vision problems


Deficits: no difficulties


Baseline/Normal Cognition: alert oriented x 3


Baseline Gait: walks w/o assistance


Associated Symptoms: nausea, vomiting, paresthesia (of the R face), slurred 

speech, vision changes


Allergies/Adverse Reactions: 








No Known Drug Allergies Allergy (Verified 03/29/19 16:28)


 





Home Medications: 








No Home Meds [No Home Meds****]  05/31/14 [History]





Hx Tetanus, Diphtheria Vaccination/Date Given: Yes


Hx Influenza Vaccination/Date Given: No


Hx Pneumococcal Vaccination/Date Given: No


Immunizations Up to Date: Yes





- Review of Systems


Constitutional: No Fever, No Chills


Eyes: Vision Changes


Ears, Nose, & Throat: Other (slurred speech, and numbness of R side of face)


Respiratory: No Cough, No Dyspnea


Cardiac: No Chest Pain, No Edema, No Syncope


Abdominal/Gastrointestinal: No Abdominal Pain, No Nausea, No Vomiting, No 

Diarrhea


Neurological: Focal Weakness (of R side of face), Headache, Parasthesia (of R 

face), Speech Changes





- Past Medical History


Pertinent Past Medical History: Yes


Neurological History: No Pertinent History


ENT History: No Pertinent History


Cardiac History: No Pertinent History


Respiratory History: No Pertinent History


Endocrine Medical History: No Pertinent History


Musculoskeletal History: No Pertinent History


GI Medical History: No Pertinent History


 History: Kidney Cancer


Psycho-Social History: Anxiety, Depression


Male Reproductive Disorders: No Pertinent History





- Past Surgical History


Past Surgical History: Yes


Neuro Surgical History: No Pertinent History


Cardiac: No Pertinent History


Respiratory: No Pertinent History


Gastrointestinal: No Pertinent History


Genitourinary: No Pertinent History


Musculoskeletal: No Pertinent History


Male Surgical History: No Pertinent History


Other Surgical History: RIGHT ARM SURGERY





- Social History


Smoking Status: Current every day smoker


How long have you smoked: 10 years


Exposure to second hand smoke: Yes


Alcohol Use: Socially


Drug Use: marijuana


Patient Lives Alone: No


Significant Family History: no pertinent family hx





- Nursing Vital Signs


Nursing Vital Signs: 





 Initial Vital Signs











Temperature  97.5 F   08/29/19 20:15


 


Pulse Rate  79   08/29/19 20:15


 


Respiratory Rate  18   08/29/19 20:15


 


Blood Pressure  125/81   08/29/19 20:15


 


O2 Sat by Pulse Oximetry  97   08/29/19 20:15








 Pain Scale











Pain Intensity                 7

















- [place] Coma Scale


Best Eye Response ([place]): (4) open spontaneously


Best Verbal Response ([place]): (5) oriented


Best Motor Response ([place]): (6) obeys commands


[place] Total: 15





- Physical Exam


General Appearance: moderate distress, alert


Eye Exam: bilateral eye: normal inspection, PERRL, EOMI


Ears, Nose, Throat Exam: normal ENT inspection, moist mucous membranes


Neck Exam: normal inspection, non-tender, supple


Respiratory: normal breath sounds, lungs clear, airway intact, No respiratory 

distress


Cardiovascular: regular rate/rhythm, No edema


Gastrointestinal: soft, No tenderness, No distention


Back Exam: normal inspection


Extremity Exam: normal inspection, No pedal edema


Mental Status: alert, oriented x 3


CNs Exam: abnormal speech, facial droop (on R.  ), facial paresthesias (On R)


Coordination/Gait: normal finger to nose, normal gait


Skin Exam: other (multiple abscess all over the extremities, probably from 

picking)


**SpO2 Interpretation**: normal


SpO2: 98


O2 Delivery: Room Air





- Course


Nursing assessment & vital signs reviewed: Yes





- CT Exams


  ** Head


CT Interpretation: Tele-radiologist Report (Old basal ganglia infarct)


Ordered Tests: 





 Active Orders 24 hr











 Category Date Time Status


 


 EKG-ER Only STAT Care  08/29/19 20:16 Active


 


 IV Insertion STAT Care  08/29/19 20:16 Active


 


 IV Insertion-2nd Peripheral STAT Care  08/29/19 20:16 Active


 


 NPO (ED) STAT Care  08/29/19 20:16 Active


 


 NPO (ED) STAT Care  08/29/19 20:16 Active


 


 Oxygen-ED Only Nasal Cannula 2 lpm Care  08/29/19 20:16 Active


 


 HEAD WITHOUT CONTRAST [CT] Stat Exams  08/29/19 20:40 Taken


 


 BMP Stat Lab  08/29/19 20:25 Completed


 


 CBC W DIFF Stat Lab  08/29/19 20:25 Completed


 


 CULTURE,URINE Stat Lab  08/29/19 20:20 Received


 


 ETHYL ALCOHOL Stat Lab  08/29/19 20:25 Completed


 


 PTT Stat Lab  08/29/19 20:25 Completed


 


 TROPONIN Q3H Lab  08/29/19 20:25 Completed


 


 TROPONIN Q3H Lab  08/29/19 23:30 Ordered


 


 TROPONIN Q3H Lab  08/30/19 02:30 Ordered


 


 TROPONIN Q3H Lab  08/30/19 05:30 Ordered


 


 TROPONIN Q3H Lab  08/30/19 08:30 Ordered


 


 UA W/RFX UR CULTURE Stat Lab  08/29/19 20:20 Completed


 


 Urine Triage Profile Stat Lab  08/29/19 20:20 Completed








Medication Summary














Discontinued Medications














Generic Name Dose Route Start Last Admin





  Trade Name Freq  PRN Reason Stop Dose Admin


 


Acetaminophen  975 mg  08/29/19 22:24  08/29/19 22:43





  Tylenol 325 Mg***  PO  08/29/19 22:25  975 mg





  STAT ONE   Administration





     





     





     





     


 


Acetaminophen  Confirm  08/29/19 22:40  





  Tylenol 325 Mg***  Administered  08/29/19 22:41  





  Dose   





  975 mg   





  .ROUTE   





  .STK-MED ONE   





     





     





     





     


 


Aspirin  81 mg  08/29/19 22:26  08/29/19 22:44





  Baby Aspirin 81 Mg Chew***  PO  08/29/19 22:27  81 mg





  STAT ONE   Administration





     





     





     





     


 


Aspirin  Confirm  08/29/19 22:40  





  Baby Aspirin 81 Mg Chew***  Administered  08/29/19 22:41  





  Dose   





  81 mg   





  .ROUTE   





  .STK-MED ONE   





     





     





     





     


 


Promethazine HCl  25 mg  08/29/19 22:24  08/29/19 22:42





  Phenergan 25 Mg Inj***  IM  08/29/19 22:25  25 mg





  STAT ONE   Administration





     





     





     





     


 


Promethazine HCl  Confirm  08/29/19 22:40  





  Phenergan 25 Mg Inj***  Administered  08/29/19 22:41  





  Dose   





  25 mg   





  .ROUTE   





  .STK-MED ONE   





     





     





     





     











Lab/Rad Data: 





 Laboratory Result Diagrams





 08/29/19 20:25 





 08/29/19 20:25 





 Laboratory Results











  08/29/19 08/29/19 08/29/19 Range/Units





  20:25 20:25 20:25 


 


WBC     (4.0-10.5)  K/mm3


 


RBC     (4.1-5.6)  M/mm3


 


Hgb     (12.5-18.0)  gm/dl


 


Hct     (42-50)  %


 


MCV     ()  fl


 


MCH     (26-32)  pg


 


MCHC     (32-36)  g/dl


 


RDW     (11.5-14.0)  %


 


Plt Count     (150-450)  K/mm3


 


MPV     (6-9.5)  fl


 


Gran %     (36.0-66.0)  %


 


Eos # (Auto)     (0-0.5)  


 


Absolute Lymphs (auto)     (1.0-4.6)  


 


Absolute Monos (auto)     (0.0-1.3)  


 


Lymphocytes %     (24.0-44.0)  %


 


Monocytes %     (0.0-12.0)  %


 


Eosinophils %     (0.00-5.0)  %


 


Basophils %     (0.0-0.4)  %


 


Absolute Granulocytes     (1.4-6.9)  


 


Basophils #     (0-0.4)  


 


APTT   27.6   (24.1-36.1)  SECONDS


 


Sodium    139  (137-145)  mmol/L


 


Potassium    3.6  (3.5-5.1)  mmol/L


 


Chloride    105  ()  mmol/L


 


Carbon Dioxide    25  (22-30)  mmol/L


 


Anion Gap    13.6  (5-15)  MEQ/L


 


BUN    15  (9-20)  mg/dL


 


Creatinine    0.84  (0.66-1.25)  mg/dL


 


Estimated GFR    > 60.0  ML/MIN


 


Glucose    122 H  ()  mg/dL


 


Calcium    9.3  (8.4-10.2)  mg/dL


 


Troponin I  < 0.012    (0.000-0.034)  ng/mL


 


Urine Color     (YELLOW)  


 


Urine Appearance     (CLEAR)  


 


Urine pH     (5-6)  


 


Ur Specific Gravity     (1.005-1.025)  


 


Urine Protein     (Negative)  


 


Urine Ketones     (NEGATIVE)  


 


Urine Blood     (0-5)  Blanco/ul


 


Urine Nitrite     (NEGATIVE)  


 


Urine Bilirubin     (NEGATIVE)  


 


Urine Urobilinogen     (0-1)  mg/dL


 


Ur Leukocyte Esterase     (NEGATIVE)  


 


Urine WBC (Auto)     (0-5)  /HPF


 


Urine RBC (Auto)     (0-2)  /HPF


 


U Hyaline Cast (Auto)     (0-2)  /LPF


 


U Epithel Cells (Auto)     (FEW)  /HPF


 


Urine Bacteria (Auto)     (NEGATIVE)  /HPF


 


Other Casts (Auto)     (NEGATIVE)  /LPF


 


Urine Mucus (Auto)     (NEGATIVE)  /HPF


 


Urine Culture Reflexed     (NO)  


 


Urine Glucose     (NEGATIVE)  mg/dL


 


Urine Opiates Level     (NEGATIVE)  


 


Ur Methadone     (NEGATIVE)  


 


Urine Barbiturates     (NEGATIVE)  


 


Ur Phencyclidine (PCP)     (NEGATIVE)  


 


Urine Amphetamine     (NEGATIVE)  


 


U Benzodiazepine Level     (NEGATIVE)  


 


Urine Cocaine     (NEGATIVE)  


 


Urine Marijuana (THC)     (NEGATIVE)  


 


Ethyl Alcohol    < 10  (0-10)  mg/dL














  08/29/19 08/29/19 08/29/19 Range/Units





  20:25 20:20 20:20 


 


WBC  7.9    (4.0-10.5)  K/mm3


 


RBC  5.17    (4.1-5.6)  M/mm3


 


Hgb  16.0    (12.5-18.0)  gm/dl


 


Hct  46.5    (42-50)  %


 


MCV  89.9    ()  fl


 


MCH  30.9    (26-32)  pg


 


MCHC  34.4    (32-36)  g/dl


 


RDW  13.3    (11.5-14.0)  %


 


Plt Count  221    (150-450)  K/mm3


 


MPV  11.1 H    (6-9.5)  fl


 


Gran %  58.9    (36.0-66.0)  %


 


Eos # (Auto)  0.28    (0-0.5)  


 


Absolute Lymphs (auto)  2.23    (1.0-4.6)  


 


Absolute Monos (auto)  0.68    (0.0-1.3)  


 


Lymphocytes %  28.3    (24.0-44.0)  %


 


Monocytes %  8.6    (0.0-12.0)  %


 


Eosinophils %  3.6    (0.00-5.0)  %


 


Basophils %  0.6    (0.0-0.4)  %


 


Absolute Granulocytes  4.64    (1.4-6.9)  


 


Basophils #  0.05    (0-0.4)  


 


APTT     (24.1-36.1)  SECONDS


 


Sodium     (137-145)  mmol/L


 


Potassium     (3.5-5.1)  mmol/L


 


Chloride     ()  mmol/L


 


Carbon Dioxide     (22-30)  mmol/L


 


Anion Gap     (5-15)  MEQ/L


 


BUN     (9-20)  mg/dL


 


Creatinine     (0.66-1.25)  mg/dL


 


Estimated GFR     ML/MIN


 


Glucose     ()  mg/dL


 


Calcium     (8.4-10.2)  mg/dL


 


Troponin I     (0.000-0.034)  ng/mL


 


Urine Color    IVA  (YELLOW)  


 


Urine Appearance    SLIGHTLY CLOUDY  (CLEAR)  


 


Urine pH    5.0  (5-6)  


 


Ur Specific Gravity    1.029  (1.005-1.025)  


 


Urine Protein    100  (Negative)  


 


Urine Ketones    TRACE  (NEGATIVE)  


 


Urine Blood    LARGE  (0-5)  Blanco/ul


 


Urine Nitrite    NEGATIVE  (NEGATIVE)  


 


Urine Bilirubin    NEGATIVE  (NEGATIVE)  


 


Urine Urobilinogen    2  (0-1)  mg/dL


 


Ur Leukocyte Esterase    NEGATIVE  (NEGATIVE)  


 


Urine WBC (Auto)    11-15  (0-5)  /HPF


 


Urine RBC (Auto)    >101  (0-2)  /HPF


 


U Hyaline Cast (Auto)    3-5  (0-2)  /LPF


 


U Epithel Cells (Auto)    FEW  (FEW)  /HPF


 


Urine Bacteria (Auto)    FEW  (NEGATIVE)  /HPF


 


Other Casts (Auto)    NEGATIVE  (NEGATIVE)  /LPF


 


Urine Mucus (Auto)    MODERATE  (NEGATIVE)  /HPF


 


Urine Culture Reflexed    YES  (NO)  


 


Urine Glucose    NEGATIVE  (NEGATIVE)  mg/dL


 


Urine Opiates Level   NEGATIVE   (NEGATIVE)  


 


Ur Methadone   NEGATIVE   (NEGATIVE)  


 


Urine Barbiturates   NEGATIVE   (NEGATIVE)  


 


Ur Phencyclidine (PCP)   NEGATIVE   (NEGATIVE)  


 


Urine Amphetamine   POSITIVE   (NEGATIVE)  


 


U Benzodiazepine Level   NEGATIVE   (NEGATIVE)  


 


Urine Cocaine   NEGATIVE   (NEGATIVE)  


 


Urine Marijuana (THC)   POSITIVE   (NEGATIVE)  


 


Ethyl Alcohol     (0-10)  mg/dL














- Progress


Progress: unchanged


Progress Note: 





08/29/19 23:22


Tele neurology was consulted.  She did not recommend TPA.  The physician asked 

for pt to be placed in observation with Neurology consult and MRI tomorrow.  

She recommended headache medication, and ASA.  Work up for hypercoagulable 

state.  The physician is not connected to any hospital sys=tem, and any 

hospital with neurology is a good option.  [facility] accepted pt to ER Dr Arauz.


Will see patient in: other (Transfer to [facility])





- Departure


Departure Disposition: Transfer


Clinical Impression: 


 CVA (cerebral vascular accident)





Condition: Stable


Critical Care Time: Yes


Critical Care Time(excluding separately billable procedures): Critical 30-74 

mins


Referrals: 


MARY MARTIN [Primary Care Provider] - 


Additional Instructions: 


Pt got ASA 81 mg, Tylenol and Phenergan, for HA.  [facility]  [facility] Arauz is accepting.

## 2019-08-30 VITALS — SYSTOLIC BLOOD PRESSURE: 138 MMHG | OXYGEN SATURATION: 97 % | HEART RATE: 66 BPM | DIASTOLIC BLOOD PRESSURE: 78 MMHG

## 2019-08-30 NOTE — XRAY
Indication: Difficulty with speech.



Multiple contiguous axial images obtained through the head without contrast.



Comparison: None



Ventriculosulcal pattern appears symmetric.  Left basal ganglia demonstrates

11 mm round focus of old infarct.  No acute intracranial hemorrhage, abnormal

extra-axial fluid collection, or mass effect.  Fourth ventricle is midline

without hydrocephalus.  Gray white matter differentiation preserved.  Bony

calvarium intact.  Visualized paranasal sinuses and mastoid air cells are

clear.



Impression: Remote appearing left basal ganglia infarct.  No acute

intracranial abnormalities.



CT DI 66.37

## 2019-12-04 ENCOUNTER — HOSPITAL ENCOUNTER (EMERGENCY)
Dept: HOSPITAL 33 - ED | Age: 39
Discharge: HOME | End: 2019-12-04
Payer: COMMERCIAL

## 2019-12-04 VITALS — SYSTOLIC BLOOD PRESSURE: 133 MMHG | OXYGEN SATURATION: 96 % | DIASTOLIC BLOOD PRESSURE: 87 MMHG | HEART RATE: 116 BPM

## 2019-12-04 DIAGNOSIS — L03.113: Primary | ICD-10-CM

## 2019-12-04 DIAGNOSIS — R11.2: ICD-10-CM

## 2019-12-04 PROCEDURE — 73130 X-RAY EXAM OF HAND: CPT

## 2019-12-04 PROCEDURE — 99284 EMERGENCY DEPT VISIT MOD MDM: CPT

## 2019-12-04 PROCEDURE — 96372 THER/PROPH/DIAG INJ SC/IM: CPT

## 2019-12-04 NOTE — ERPHSYRPT
- History of Present Illness


Time Seen by Provider: 12/04/19 09:25


Source: patient


Exam Limitations: no limitations


Physician History: 





pT IS HERE WITH C/C OF RIGHT HAND PARTICULARLY RIGHT INDEX FINGER PAIN AND 

SWELLING. PT IS STATING THAT THE PAIN IS EXCRUCIATING. pT IS ALSO STATING THAT 

HIS LAST TETANUS SHOT WAS 2015. 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 

HE OBTAINED A CUT TO HIS RIGHT INDEX FINGER TIP. pT HAS AN OLDER LESION ON HIS 

HAND AND IT LOOKS A BIT MORE INFLAMED.  pT WITH A HISTORY OF RANAL CARCINOMA SO 

DOES NOT TAKE nssaids. pT DID TAKE A GOODY PAIN PACKET WITH ACETAMENOPHEN AND 

ASPRIN IN IT. 


Occurred: yesterday


Method of Injury: incised (ON A TRUCK THAT HE WAS WORKING ON)


Quality: constant, other (THROBBING)


Severity of Pain-Max: severe


Severity of Pain-Current: severe


Extremities Pain Location: 2nd finger: right


Modifying Factors: Improves With: nothing


Associated Symptoms: nausea, vomiting, other (SWELLING OF THE RIGHT INDEX FINGER

)


Allergies/Adverse Reactions: 








No Known Drug Allergies Allergy (Verified 12/04/19 09:30)


 





Home Medications: 








No Reportable Medications [No Reported Medications]  12/04/19 [History]





Hx Tetanus, Diphtheria Vaccination/Date Given: Yes (2015)


Hx Influenza Vaccination/Date Given: No


Hx Pneumococcal Vaccination/Date Given: No





- Review of Systems


Constitutional: Fever, Other (MILD)


Eyes: No Symptoms


Ears, Nose, & Throat: No Symptoms


Respiratory: No Symptoms


Cardiac: No Symptoms


Abdominal/Gastrointestinal: Vomiting


Genitourinary Symptoms: No Symptoms


Musculoskeletal: Joint Swelling


Skin: Other (TO THE TIP OF THE RIGHT INDEX WITH A SCABBED LESION ON THE RIGHT 

DORSAL HAND)


Neurological: No Symptoms


Psychological: No Symptoms





- Past Medical History


Pertinent Past Medical History: No


Neurological History: No Pertinent History


ENT History: No Pertinent History


Cardiac History: No Pertinent History


Respiratory History: No Pertinent History


Endocrine Medical History: No Pertinent History


Musculoskeletal History: No Pertinent History


GI Medical History: No Pertinent History


 History: Kidney Cancer


Psycho-Social History: No Pertinent History


Male Reproductive Disorders: No Pertinent History





- Past Surgical History


Past Surgical History: Yes


Neuro Surgical History: No Pertinent History


Cardiac: No Pertinent History


Respiratory: No Pertinent History


Gastrointestinal: No Pertinent History


Genitourinary: No Pertinent History


Musculoskeletal: No Pertinent History


Male Surgical History: No Pertinent History


Other Surgical History: RIGHT ARM SURGERY





- Social History


Smoking Status: Current every day smoker


How long have you smoked: 10


Exposure to second hand smoke: Yes


Alcohol Use: Socially


Drug Use: marijuana


Patient Lives Alone: No


Significant Family History: no pertinent family hx





- Nursing Vital Signs


Nursing Vital Signs: 


 Initial Vital Signs











Temperature  98.9 F   12/04/19 09:13


 


Pulse Rate  116 H  12/04/19 09:13


 


Blood Pressure  133/87   12/04/19 09:13


 


O2 Sat by Pulse Oximetry  96   12/04/19 09:13








 Pain Scale











Pain Intensity                 2

















- Physical Exam


General Appearance: mild distress, other (PORTRAYING SEVERE PAIN)


Eyes, Ears, Nose, Throat Exam: normal ENT inspection


Cardiovascular/Respiratory Exam: chest non-tender, normal breath sounds, 

regular rate/rhythm, heart sounds normal


Abdominal Exam: non-tender, soft ( + BOWEL SOUNDS)


Shoulder Exam: normal inspection, no evidence of injury


Elbow/Forearm Exam: normal inspection


Wrist Exam: normal inspection


Hand Exam: normal ROM, abrasions, infection, laceration (Pt has a small cut in 

the tip of his finger, but pt;s fingers are very dirty from his work such that 

the laceration is difficult to see but also there is sure to have been dirt in 

the lesion.), soft tissue tenderness, swelling (MILD SWELLING OF THE RIGHT 

INDEX FINGER AND DORSAL HAND), No deformity


Neuro/Tendon Exam: normal motor functions, no evidence tendon injury, No motor 

deficit, No tendon function deficit


Mental Status Exam: alert, oriented x 3, cooperative, other (IN DRAMATIC PAIN 

DISPROPORTIONATE TO THE LESION OR SWELLING)


SpO2: 96


O2 Delivery: Room Air


Ordered Tests: 


 Active Orders 24 hr











 Category Date Time Status


 


 HAND (MINIMUM 3 VIEWS) Stat Exams  12/04/19 10:05 Completed








Medication Summary














Discontinued Medications














Generic Name Dose Route Start Last Admin





  Trade Name Freq  PRN Reason Stop Dose Admin


 


Ceftriaxone Sodium  1,000 mg  12/04/19 09:47  12/04/19 09:57





  Rocephin 1000 Mg Inj**  IM  12/04/19 09:48  1,000 mg





  STAT ONE   Administration





     





     





     





     


 


Ceftriaxone Sodium  Confirm  12/04/19 09:52  





  Rocephin 1000 Mg Inj**  Administered  12/04/19 09:53  





  Dose   





  1,000 mg   





  .ROUTE   





  .STK-MED ONE   





     





     





     





     


 


Ondansetron HCl  4 mg  12/04/19 09:50  12/04/19 09:57





  Zofran Odt 4 Mg***  PO  12/04/19 09:51  4 mg





  STAT ONE   Administration





     





     





     





     


 


Ondansetron HCl  Confirm  12/04/19 09:52  





  Zofran Odt 4 Mg***  Administered  12/04/19 09:53  





  Dose   





  4 mg   





  .ROUTE   





  .STK-MED ONE   





     





     





     





     














- Progress


Progress: improved


Progress Note: 





12/04/19 10:53


HAND X-RAY WITH OLD FINDINGS BUT NOTHING ACUTE. pARTICULARLY NO FOREIGN BODY





- Departure


Departure Disposition: Home


Clinical Impression: 


 Cellulitis of finger of right hand





Condition: Good


Critical Care Time: No


Referrals: 


MARY MARTIN [Primary Care Provider] - 


Instructions:  Cellulitis (Skin Infection), Adult (DC)


Additional Instructions: 


tAKE THE ANTIBIOTIC PRESCRIBED - kEFLEX AND FOLLOW UP WITH YOUR pRIMARY CARE 

PROVIDER OR YOU MAY FOLLOW UP WITH sEE THE LIST. fOLLOW UP WITHIN THE NEXT 1 

WEEK. RETUR TO THE ER WITH ANY EMERGENT PROBLEMS.

## 2019-12-04 NOTE — XRAY
Indication: Pain following injury.



Comparison: None



3 views of the right hand demonstrates minimally angulated healing distal 5th

metacarpal fracture and ulnar styloid tip well-circumscribed ossification

either developmental versus nonunited old fracture.  No other bony, articular,

or soft tissue abnormalities.

## 2020-02-13 ENCOUNTER — HOSPITAL ENCOUNTER (EMERGENCY)
Dept: HOSPITAL 33 - ED | Age: 40
Discharge: LEFT BEFORE DISCHARGE | End: 2020-02-13
Payer: COMMERCIAL

## 2020-02-13 VITALS — SYSTOLIC BLOOD PRESSURE: 141 MMHG | HEART RATE: 73 BPM | DIASTOLIC BLOOD PRESSURE: 90 MMHG | OXYGEN SATURATION: 98 %

## 2020-02-13 DIAGNOSIS — R51: Primary | ICD-10-CM

## 2020-02-13 DIAGNOSIS — R07.9: ICD-10-CM

## 2020-02-13 PROCEDURE — 99283 EMERGENCY DEPT VISIT LOW MDM: CPT

## 2022-12-04 ENCOUNTER — HOSPITAL ENCOUNTER (EMERGENCY)
Dept: HOSPITAL 33 - ED | Age: 42
Discharge: LEFT BEFORE DISCHARGE | End: 2022-12-04
Payer: COMMERCIAL

## 2022-12-04 VITALS — SYSTOLIC BLOOD PRESSURE: 118 MMHG | DIASTOLIC BLOOD PRESSURE: 75 MMHG

## 2022-12-04 VITALS — OXYGEN SATURATION: 98 %

## 2022-12-04 VITALS — HEART RATE: 78 BPM

## 2022-12-04 DIAGNOSIS — L03.211: ICD-10-CM

## 2022-12-04 DIAGNOSIS — K08.89: ICD-10-CM

## 2022-12-04 DIAGNOSIS — R11.2: ICD-10-CM

## 2022-12-04 DIAGNOSIS — R10.9: ICD-10-CM

## 2022-12-04 DIAGNOSIS — Z72.0: ICD-10-CM

## 2022-12-04 DIAGNOSIS — T36.8X5A: ICD-10-CM

## 2022-12-04 DIAGNOSIS — K04.7: Primary | ICD-10-CM

## 2022-12-04 LAB
ALBUMIN SERPL-MCNC: 4.5 G/DL (ref 3.5–5)
ALP SERPL-CCNC: 74 U/L (ref 38–126)
ALT SERPL-CCNC: 21 U/L (ref 0–50)
ANION GAP SERPL CALC-SCNC: 13.5 MEQ/L (ref 5–15)
AST SERPL QL: 36 U/L (ref 17–59)
BASOPHILS # BLD AUTO: 0.08 X10^3/UL (ref 0–0.4)
BILIRUB BLD-MCNC: 2.6 MG/DL (ref 0.2–1.3)
BUN SERPL-MCNC: 19 MG/DL (ref 9–20)
CALCIUM SPEC-MCNC: 9.6 MG/DL (ref 8.4–10.2)
CHLORIDE SERPL-SCNC: 106 MMOL/L (ref 98–107)
CO2 SERPL-SCNC: 23 MMOL/L (ref 22–30)
CREAT SERPL-MCNC: 0.78 MG/DL (ref 0.66–1.25)
EOSINOPHIL # BLD AUTO: 0.15 X10^3/UL (ref 0–0.5)
GFR SERPLBLD BASED ON 1.73 SQ M-ARVRAT: > 60 ML/MIN
GLUCOSE SERPL-MCNC: 91 MG/DL (ref 74–106)
HCT VFR BLD AUTO: 43.8 % (ref 42–50)
HGB BLD-MCNC: 15.3 G/DL (ref 12.5–18)
LIPASE SERPL-CCNC: 44 U/L (ref 23–300)
LYMPHOCYTES # SPEC AUTO: 2.09 X10^3/UL (ref 1–4.6)
MCH RBC QN AUTO: 30.7 PG (ref 26–32)
MCHC RBC AUTO-ENTMCNC: 34.9 G/DL (ref 32–36)
MONOCYTES # BLD AUTO: 0.73 X10^3/UL (ref 0–1.3)
PLATELET # BLD AUTO: 245 X10^3/UL (ref 150–450)
POTASSIUM SERPLBLD-SCNC: 3.4 MMOL/L (ref 3.5–5.1)
PROT SERPL-MCNC: 7.7 G/DL (ref 6.3–8.2)
RBC # BLD AUTO: 4.99 X10^6/UL (ref 4.1–5.6)
SODIUM SERPL-SCNC: 139 MMOL/L (ref 137–145)
WBC # BLD AUTO: 11 X10^3/UL (ref 4–10.5)

## 2022-12-04 PROCEDURE — 87040 BLOOD CULTURE FOR BACTERIA: CPT

## 2022-12-04 PROCEDURE — 96374 THER/PROPH/DIAG INJ IV PUSH: CPT

## 2022-12-04 PROCEDURE — 85025 COMPLETE CBC W/AUTO DIFF WBC: CPT

## 2022-12-04 PROCEDURE — 36000 PLACE NEEDLE IN VEIN: CPT

## 2022-12-04 PROCEDURE — 99284 EMERGENCY DEPT VISIT MOD MDM: CPT

## 2022-12-04 PROCEDURE — 36415 COLL VENOUS BLD VENIPUNCTURE: CPT

## 2022-12-04 PROCEDURE — 70486 CT MAXILLOFACIAL W/O DYE: CPT

## 2022-12-04 PROCEDURE — 80053 COMPREHEN METABOLIC PANEL: CPT

## 2022-12-04 PROCEDURE — 83605 ASSAY OF LACTIC ACID: CPT

## 2022-12-04 PROCEDURE — 83690 ASSAY OF LIPASE: CPT

## 2022-12-04 PROCEDURE — 96375 TX/PRO/DX INJ NEW DRUG ADDON: CPT

## 2022-12-04 NOTE — ERPHSYRPT
- History of Present Illness


Time Seen by Provider: 12/04/22 19:09


Source: patient


Exam Limitations: no limitations


Patient Subjective Stated Complaint: pt states he has been sick to his stomach 

after he had an urgent care visit for his tooth infection on left lower side of 

jaw, states he had more pain after his appointment. pt was prescibed clindamycin

and naproxin at that appointment yesterday, once he began taking the clindamycin

and naproxyn yesterday at 9 pm he began to be nauseated and vomited several 

times yesterday and today


Triage Nursing Assessment: pt is alert and oriented, changing positions 

frequently in bed due to nausea and pain. pt appears pale and is rubbing left 

side of jaw.


Physician History: 





42 years old male presented in the ER with chief complaint of left sided facial 

pain and swelling for the last couple of days which is progressively worsening. 

Patient reports he was evaluated at [facility], was prescribed clindamycin and 

naproxen and his pain is getting worse and has so having multiple episodes of 

nonprojectile, nonbilious vomiting without hematemesis.  Has some abdominal 

discomfort.  No fever or chills reported.


Timing/Duration: days (3)


Severity: moderate, severe


ENT Location: facial, dental


Prearrival Treatment: prescription meds


Associated Symptoms: poor fluid intake, poor solids intake, swollen glands, 

tooth pain, No fever


Allergies/Adverse Reactions: 








No Known Drug Allergies Allergy (Verified 02/13/20 14:31)


   





Home Medications: 








No Reportable Medications [No Reported Medications]  12/04/19 [History]





Hx Tetanus, Diphtheria Vaccination/Date Given: Yes (2015)


Hx Influenza Vaccination/Date Given: No


Hx Pneumococcal Vaccination/Date Given: No





Travel Risk





- International Travel


Have you traveled outside of the country in past 3 weeks: No





- Coronavirus Screening


Are you exhibiting any of the following symptoms?: No


Close contact with a COVID-19 positive Pt in past 14-21 Days: No





- Vaccine Status


Have you recieved a Covid-19 vaccination: Yes


: Moderna





- Vaccination Dates


Date of 2cond Vaccination (if applicable): unknown





- Review of Systems


Constitutional: No Symptoms


Eyes: No Symptoms


Ears, Nose, & Throat: Mouth Pain, Mouth Swelling


Respiratory: No Symptoms


Cardiac: No Symptoms


Abdominal/Gastrointestinal: Abdominal Pain, Nausea, Vomiting


Genitourinary Symptoms: No Symptoms


Musculoskeletal: No Symptoms


Neurological: No Symptoms


Endocrine: No Symptoms


Hematologic/Lymphatic: No Symptoms


Immunological/Allergic: No Symptoms





- Past Medical History


Pertinent Past Medical History: No


Neurological History: No Pertinent History


ENT History: No Pertinent History


Cardiac History: No Pertinent History


Respiratory History: No Pertinent History


Endocrine Medical History: No Pertinent History


Musculoskeletal History: No Pertinent History


GI Medical History: No Pertinent History


 History: Kidney Cancer


Psycho-Social History: No Pertinent History


Male Reproductive Disorders: No Pertinent History


Other Medical History: possibly had stroke in 2019





- Past Surgical History


Past Surgical History: Yes


Neuro Surgical History: No Pertinent History


Cardiac: No Pertinent History


Respiratory: No Pertinent History


Gastrointestinal: No Pertinent History


Genitourinary: No Pertinent History


Musculoskeletal: No Pertinent History


Male Surgical History: No Pertinent History


Other Surgical History: RIGHT ARM SURGERY





- Social History


Smoking Status: Current every day smoker


How long have you smoked: 10


Exposure to second hand smoke: Yes


Alcohol Use: Socially


Drug Use: marijuana


Patient Lives Alone: No


Significant Family History: no pertinent family hx





- Nursing Vital Signs


Nursing Vital Signs: 


                               Initial Vital Signs











Temperature  97.4 F   12/04/22 19:11


 


Pulse Rate  65   12/04/22 19:11


 


Respiratory Rate  18   12/04/22 19:11


 


Blood Pressure  133/78   12/04/22 19:11


 


O2 Sat by Pulse Oximetry  98   12/04/22 19:11








                                   Pain Scale











Pain Intensity                 10

















- Physical Exam


General Appearance: no apparent distress, alert


Eye Exam: bilateral eye: normal inspection, PERRL, EOMI


Ear Exam: bilateral ear: auricle normal, canal normal, TM normal


Nasal Exam: normal inspection


Throat Exam: normal, pharynx normal, dental tenderness (Left lower molar and 

premolar area with gingival swelling.  No fluctuation.)


Neck Exam: normal inspection, non-tender, supple, full range of motion, 

lymphadenopathy (L)


Cardiovascular/Respiratory Exam: normal breath sounds, regular rate/rhythm


Abdominal Exam: soft, no organomegaly, tenderness (Minimal left upper quadrant 

tenderness)


Neurologic Exam: alert, oriented x 3, cooperative


Skin Exam: normal color


**SpO2 Interpretation**: normal


SpO2: 98


O2 Delivery: Room Air


Ordered Tests: 


                               Active Orders 24 hr











 Category Date Time Status


 


 AMA [Release AMA] OM.NOW Care  12/04/22 21:37 Active


 


 IV Insertion STAT Care  12/04/22 19:23 Active


 


 NPO (ED) STAT Care  12/04/22 19:23 Active


 


 FACIAL BONES WO CONTRAST [CT] Stat Exams  12/04/22 19:23 Taken


 


 BLOOD CULTURE Stat Lab  12/04/22 20:35 Received


 


 CBC W DIFF Stat Lab  12/04/22 19:51 Completed


 


 CMP Stat Lab  12/04/22 19:51 Completed


 


 LIPASE Stat Lab  12/04/22 19:51 Completed


 


 Lactic Acid Stat Lab  12/04/22 19:40 Completed


 


 UA W/RFX CULTURE Stat Lab  12/04/22 Ordered








Medication Summary











Generic Name Dose Route Start Last Admin





  Trade Name Puja  PRN Reason Stop Dose Admin


 


Ampicillin Sodium/Sulbactam  100 mls @ 200 mls/hr  12/04/22 21:25 





  Sodium 1.5 g/ Sodium Chloride  IV  12/04/22 21:54 





  STAT ONE  














Discontinued Medications














Generic Name Dose Route Start Last Admin





  Trade Name Frekristin  PRN Reason Stop Dose Admin


 


Sodium Chloride  1,000 mls @ 999 mls/hr  12/04/22 19:24  12/04/22 19:36





  Sodium Chloride 0.9% 1000 Ml  IV  12/04/22 20:24  999 mls/hr





  .Q1H1M STA   Administration


 


Sodium Chloride  Confirm  12/04/22 19:35 





  Sodium Chloride 0.9% 1000 Ml  Administered  12/04/22 19:36 





  Dose  





  1,000 mls @ ud  





  .ROUTE  





  .STK-MED ONE  


 


Morphine Sulfate  4 mg  12/04/22 19:23  12/04/22 19:36





  Morphine Sulfate 4 Mg/Ml Injection  IV  12/04/22 19:24  4 mg





  STAT ONE   Administration


 


Morphine Sulfate  Confirm  12/04/22 19:35 





  Morphine Sulfate 4 Mg/Ml Injection  Administered  12/04/22 19:36 





  Dose  





  4 mg  





  .ROUTE  





  .STK-MED ONE  


 


Ondansetron HCl  4 mg  12/04/22 19:23  12/04/22 19:36





  Ondansetron Hcl 4 Mg/2 Ml Vial  IV  12/04/22 19:24  4 mg





  STAT ONE   Administration


 


Ondansetron HCl  Confirm  12/04/22 19:35 





  Ondansetron Hcl 4 Mg/2 Ml Vial  Administered  12/04/22 19:36 





  Dose  





  4 mg  





  .ROUTE  





  .STK-MED ONE  











Lab/Rad Data: 


                           Laboratory Result Diagrams





                                 12/04/22 19:51 





                                 12/04/22 19:51 





                               Laboratory Results











  12/04/22 12/04/22 12/04/22 Range/Units





  19:51 19:51 19:40 


 


WBC   11.0 H   (4.0-10.5)  x10^3/uL


 


RBC   4.99   (4.1-5.6)  x10^6/uL


 


Hgb   15.3   (12.5-18.0)  g/dL


 


Hct   43.8   (42-50)  %


 


MCV   87.8   ()  fL


 


MCH   30.7   (26-32)  pg


 


MCHC   34.9   (32-36)  g/dL


 


RDW   11.9   (11.5-14.0)  %


 


Plt Count   245   (150-450)  x10^3/uL


 


MPV   10.7   (7.5-11.0)  fL


 


Gran %   71.6 H   (36.0-66.0)  %


 


Immature Gran % (Auto)   0.5 H   (0.00-0.4)  %


 


Nucleat RBC Rel Count   0.0   (0.00-0.1)  %


 


Eos # (Auto)   0.15   (0-0.5)  x10^3/uL


 


Immature Gran # (Auto)   0.05 H   (0.00-0.03)  x10^3u/L


 


Absolute Lymphs (auto)   2.09   (1.0-4.6)  x10^3/uL


 


Absolute Monos (auto)   0.73   (0.0-1.3)  x10^3/uL


 


Absolute Nucleated RBC   0.00   (0.00-0.01)  x10^3u/L


 


Lymphocytes %   19.1 L   (24.0-44.0)  %


 


Monocytes %   6.7   (0.0-12.0)  %


 


Eosinophils %   1.4   (0.00-5.0)  %


 


Basophils %   0.7   (0.0-0.4)  %


 


Absolute Granulocytes   7.85 H   (1.4-6.9)  x10^3/uL


 


Basophils #   0.08   (0-0.4)  x10^3/uL


 


Sodium  139    (137-145)  mmol/L


 


Potassium  3.4 L    (3.5-5.1)  mmol/L


 


Chloride  106    ()  mmol/L


 


Carbon Dioxide  23    (22-30)  mmol/L


 


Anion Gap  13.5    (5-15)  MEQ/L


 


BUN  19    (9-20)  mg/dL


 


Creatinine  0.78    (0.66-1.25)  mg/dL


 


Estimated GFR  > 60.0    ML/MIN


 


Glucose  91    ()  mg/dL


 


Lactic Acid    1.4  (0.4-2.0)  


 


Calcium  9.6    (8.4-10.2)  mg/dL


 


Total Bilirubin  2.60 H    (0.2-1.3)  mg/dL


 


AST  36    (17-59)  U/L


 


ALT  21    (0-50)  U/L


 


Alkaline Phosphatase  74    ()  U/L


 


Serum Total Protein  7.7    (6.3-8.2)  g/dL


 


Albumin  4.5    (3.5-5.0)  g/dL


 


Lipase  44    ()  U/L














- Progress


Progress: improved, re-examined


Progress Note: 





12/04/22 21:45


Is given fluids and symptomatic treatment with morphine, Zofran, on reevaluation

 feeling better.  CT showed preseptal cellulitis but no abscess.  Normal white 

count, grossly unremarkable chemistries.  I believe patient is having nausea 

vomiting secondary to clindamycin.  I have recommended switching it to Augmentin

 and recommended Unasyn in here.  Patient took out his IV and left AGAINST 

MEDICAL ADVICE without talking to me.


Counseled pt/family regarding: lab results, diagnosis, need for follow-up, rad 

results





- Departure


Departure Disposition: Home


Clinical Impression: 


 Dental infection, Facial cellulitis





Condition: Stable


Critical Care Time: No

## 2022-12-05 NOTE — XRAY
Indication: Left facial swelling and pain.  Abscess.



Multiple contiguous axial images obtained through the facial bones.  Sagittal

and coronal reformatted images obtained.



Comparison: None



No acute fracture, suspicious bony lesions, or radiopaque foreign body.

Orbits including roof, walls, and floors intact.  Mild mucosal thickening both

ethmoid and floor left maxillary sinuses.  Remaining paranasal sinuses and

nasal passages are clear.  Minimal nasal septal deviation to the right.

Visualized noncontrasted soft tissues are unremarkable.  No pathologic

cervical/submandibular lymphadenopathy.  Base of brain unremarkable.



Impression: Mild paranasal sinus disease and minimal nasal septal deviation.

Remaining CT facial bones negative.



Comment: Preliminary interpretation made by [facility] [[email or web address]  No critical discrepancy.